# Patient Record
Sex: FEMALE | Race: ASIAN | NOT HISPANIC OR LATINO | Employment: UNEMPLOYED | URBAN - METROPOLITAN AREA
[De-identification: names, ages, dates, MRNs, and addresses within clinical notes are randomized per-mention and may not be internally consistent; named-entity substitution may affect disease eponyms.]

---

## 2023-05-16 LAB
ERYTHROCYTE DISTRIBUTION WIDTH (RATIO) BY AUTOMATED COUNT: 12 % (ref 11.5–14.5)
ERYTHROCYTE MEAN CORPUSCULAR HEMOGLOBIN CONCENTRATION (G/DL) BY AUTOMATED: 32.2 G/DL (ref 32–36)
ERYTHROCYTE MEAN CORPUSCULAR VOLUME (FL) BY AUTOMATED COUNT: 101 FL (ref 80–100)
ERYTHROCYTES (10*6/UL) IN BLOOD BY AUTOMATED COUNT: 3.04 X10E12/L (ref 4–5.2)
FOLATE, SERUM, PREGNANCY: 18 NG/ML
GLUCOSE, 1 HR SCREEN, PREG: 99 MG/DL
HEMATOCRIT (%) IN BLOOD BY AUTOMATED COUNT: 30.7 % (ref 36–46)
HEMOGLOBIN (G/DL) IN BLOOD: 9.9 G/DL (ref 12–16)
LEUKOCYTES (10*3/UL) IN BLOOD BY AUTOMATED COUNT: 7.3 X10E9/L (ref 4.4–11.3)
NRBC (PER 100 WBCS) BY AUTOMATED COUNT: 0 /100 WBC (ref 0–0)
PLATELETS (10*3/UL) IN BLOOD AUTOMATED COUNT: 208 X10E9/L (ref 150–450)
REFLEX ADDED, ANEMIA PANEL: ABNORMAL
VITAMIN B12, PREGNANCY: 432 PG/ML

## 2023-08-10 LAB — GROUP B STREP SCREEN: NORMAL

## 2023-10-12 PROBLEM — O36.8190 DECREASED FETAL MOVEMENT (HHS-HCC): Status: ACTIVE | Noted: 2023-10-12

## 2023-10-12 PROBLEM — O20.0 THREATENED MISCARRIAGE (HHS-HCC): Status: ACTIVE | Noted: 2023-10-12

## 2023-10-12 PROBLEM — M79.673 PAIN OF FOOT: Status: ACTIVE | Noted: 2023-10-12

## 2023-10-12 PROBLEM — N91.0 AMENORRHEA, PRIMARY: Status: ACTIVE | Noted: 2023-10-12

## 2023-10-12 PROBLEM — O99.012 ANEMIA DURING PREGNANCY IN SECOND TRIMESTER (HHS-HCC): Status: ACTIVE | Noted: 2023-10-12

## 2023-10-12 PROBLEM — M71.379 SYNOVIAL CYST OF ANKLE AND FOOT REGION: Status: ACTIVE | Noted: 2023-10-12

## 2023-10-12 PROBLEM — R50.9 FEVER: Status: ACTIVE | Noted: 2023-10-12

## 2023-10-12 PROBLEM — O03.9 COMPLETE ABORTION (HHS-HCC): Status: ACTIVE | Noted: 2023-10-12

## 2023-10-12 PROBLEM — O03.4 INCOMPLETE ABORTION (HHS-HCC): Status: ACTIVE | Noted: 2023-10-12

## 2023-10-12 RX ORDER — PNV NO.95/FERROUS FUM/FOLIC AC 28MG-0.8MG
TABLET ORAL
COMMUNITY

## 2023-10-12 RX ORDER — CALCIUM CITRATE, IRON PENTACARBONYL, FERROUS FUMARATE, CHOLECALCIFEROL, .ALPHA.-TOCOPHEROL, DL-, PYRIDOXINE HYDROCHLORIDE, FOLIC ACID, AND DOCONEXENT 2; 62; 27; 200; 15; 12.5; 1; 2 MG/1; MG/1; MG/1; [IU]/1; [IU]/1; MG/1; MG/1; MG/1
1 CAPSULE, GELATIN COATED ORAL DAILY
COMMUNITY

## 2023-10-12 RX ORDER — FERROUS SULFATE TAB 325 MG (65 MG ELEMENTAL FE) 325 (65 FE) MG
1 TAB ORAL DAILY
COMMUNITY

## 2023-10-13 ENCOUNTER — OFFICE VISIT (OUTPATIENT)
Dept: OBSTETRICS AND GYNECOLOGY | Facility: CLINIC | Age: 33
End: 2023-10-13
Payer: COMMERCIAL

## 2023-10-13 VITALS
SYSTOLIC BLOOD PRESSURE: 103 MMHG | BODY MASS INDEX: 20.04 KG/M2 | WEIGHT: 99.4 LBS | HEIGHT: 59 IN | DIASTOLIC BLOOD PRESSURE: 70 MMHG

## 2023-10-13 PROCEDURE — 1036F TOBACCO NON-USER: CPT | Performed by: OBSTETRICS & GYNECOLOGY

## 2023-10-13 PROCEDURE — 99213 OFFICE O/P EST LOW 20 MIN: CPT | Performed by: OBSTETRICS & GYNECOLOGY

## 2023-10-13 NOTE — PROGRESS NOTES
"Patient presents for 6-week postpartum visit.    Patient is status post induction of labor for fetal arrhythmia complicated by nonreassuring fetal heart tracing and primary low-transverse  section along with postpartum hemorrhage that did not require blood transfusion on 2023.  Postpartum patient has been doing quite well she admits to regular bowel movements denies any urinary symptoms.  Pain is very minimal she has no bleeding x2 weeks.  Not interested in birth control.  She is breast-feeding without any issues.  Denies postpartum depression.    Physical exam:  /70 (BP Location: Left arm, Patient Position: Sitting)   Ht 1.499 m (4' 11\")   Wt 45.1 kg (99 lb 6.4 oz)   Breastfeeding Yes   BMI 20.08 kg/m²     Declines breast exam.  Abdomen soft and nontender.  Incision clean dry and intact.  Pelvic: External genitalia normal, vagina normal rugae good tone cervix is clean uterus is small freely mobile nontender no palpable adnexal masses or tenderness.  Extremities nontender.    Normal postpartum check, patient declines birth control at this time, okay to return to normal activities with no restrictions.  She will follow-up in 1 year or as needed.  "

## 2023-11-02 LAB — LAB MOLECULAR CA TECHNICAL NOTES: NORMAL

## 2023-11-28 ENCOUNTER — TELEPHONE (OUTPATIENT)
Dept: OBSTETRICS AND GYNECOLOGY | Facility: CLINIC | Age: 33
End: 2023-11-28
Payer: COMMERCIAL

## 2023-12-02 ENCOUNTER — LAB (OUTPATIENT)
Dept: LAB | Facility: LAB | Age: 33
End: 2023-12-02
Payer: COMMERCIAL

## 2023-12-02 LAB
ERYTHROCYTE [DISTWIDTH] IN BLOOD BY AUTOMATED COUNT: 11.3 % (ref 11.5–14.5)
HCT VFR BLD AUTO: 41.5 % (ref 36–46)
HGB BLD-MCNC: 13.6 G/DL (ref 12–16)
MCH RBC QN AUTO: 31.3 PG (ref 26–34)
MCHC RBC AUTO-ENTMCNC: 32.8 G/DL (ref 32–36)
MCV RBC AUTO: 95 FL (ref 80–100)
NRBC BLD-RTO: 0 /100 WBCS (ref 0–0)
PLATELET # BLD AUTO: 218 X10*3/UL (ref 150–450)
RBC # BLD AUTO: 4.35 X10*6/UL (ref 4–5.2)
WBC # BLD AUTO: 7.3 X10*3/UL (ref 4.4–11.3)

## 2023-12-02 PROCEDURE — 36415 COLL VENOUS BLD VENIPUNCTURE: CPT

## 2023-12-02 PROCEDURE — 85027 COMPLETE CBC AUTOMATED: CPT

## 2023-12-08 NOTE — RESULT ENCOUNTER NOTE
Jose oRberto Henao,    Just wanted to let you know that your blood count came back normal. No need to take additional iron at this time. I do recommend a daily vitamin for women. Happy Holidays to you and your family.

## 2023-12-14 ENCOUNTER — OFFICE VISIT (OUTPATIENT)
Dept: PRIMARY CARE | Facility: CLINIC | Age: 33
End: 2023-12-14
Payer: COMMERCIAL

## 2023-12-14 VITALS
BODY MASS INDEX: 19.96 KG/M2 | DIASTOLIC BLOOD PRESSURE: 60 MMHG | HEIGHT: 59 IN | SYSTOLIC BLOOD PRESSURE: 102 MMHG | WEIGHT: 99 LBS

## 2023-12-14 DIAGNOSIS — Z00.00 ENCOUNTER FOR PREVENTIVE HEALTH EXAMINATION: Primary | ICD-10-CM

## 2023-12-14 PROCEDURE — 99395 PREV VISIT EST AGE 18-39: CPT | Performed by: STUDENT IN AN ORGANIZED HEALTH CARE EDUCATION/TRAINING PROGRAM

## 2023-12-14 PROCEDURE — 1036F TOBACCO NON-USER: CPT | Performed by: STUDENT IN AN ORGANIZED HEALTH CARE EDUCATION/TRAINING PROGRAM

## 2023-12-14 NOTE — PROGRESS NOTES
"Subjective   Patient ID: Earlene Rock is a 33 y.o. female who presents for Annual Exam (CPE/Form to be signed).    HPI   Yearly physical.  She feels well.  She gave birth to her first child about 3 months ago, baby girl.  Accompanied today by her .  Review of Systems  12-point ROS reviewed and was negative unless otherwise noted in HPI.    Objective   /60   Ht 1.499 m (4' 11\")   Wt (!) 44.9 kg (99 lb)   BMI 20.00 kg/m²     Physical Exam  GEN: conversant, NAD  HEENT: PERRL, EOMI, MMM  NECK: supple, no carotid bruits appreciated b/l  CV: S1, S2, RRR  PULM: CTAB  ABD: soft, NT, ND  NEURO: no new gross focal deficits  EXT: no sig LE edema  PSYCH: appropriate affect    Assessment/Plan     #Recent birth of child: baby girl via .     #HM: Tetanus vaccine in 2016. Vaccinated against COVID-19. Advised yearly flu shot. Follows with ob/gyn. Recent labs reviewed.     RTC 6-12 mos      "

## 2024-12-31 ENCOUNTER — APPOINTMENT (OUTPATIENT)
Dept: PRIMARY CARE | Facility: CLINIC | Age: 34
End: 2024-12-31
Payer: COMMERCIAL

## 2024-12-31 ENCOUNTER — LAB (OUTPATIENT)
Dept: LAB | Facility: LAB | Age: 34
End: 2024-12-31
Payer: COMMERCIAL

## 2024-12-31 VITALS
DIASTOLIC BLOOD PRESSURE: 62 MMHG | HEIGHT: 59 IN | SYSTOLIC BLOOD PRESSURE: 90 MMHG | BODY MASS INDEX: 20.16 KG/M2 | WEIGHT: 100 LBS

## 2024-12-31 DIAGNOSIS — Z00.00 ENCOUNTER FOR PREVENTIVE HEALTH EXAMINATION: Primary | ICD-10-CM

## 2024-12-31 DIAGNOSIS — Z00.00 HEALTH CARE MAINTENANCE: ICD-10-CM

## 2024-12-31 DIAGNOSIS — Z13.220 LIPID SCREENING: ICD-10-CM

## 2024-12-31 LAB
ALBUMIN SERPL BCP-MCNC: 4.2 G/DL (ref 3.4–5)
ALP SERPL-CCNC: 51 U/L (ref 33–110)
ALT SERPL W P-5'-P-CCNC: 12 U/L (ref 7–45)
ANION GAP SERPL CALC-SCNC: 12 MMOL/L (ref 10–20)
AST SERPL W P-5'-P-CCNC: 13 U/L (ref 9–39)
BILIRUB SERPL-MCNC: 0.3 MG/DL (ref 0–1.2)
BUN SERPL-MCNC: 14 MG/DL (ref 6–23)
CALCIUM SERPL-MCNC: 9.3 MG/DL (ref 8.6–10.6)
CHLORIDE SERPL-SCNC: 107 MMOL/L (ref 98–107)
CHOLEST SERPL-MCNC: 160 MG/DL (ref 0–199)
CHOLESTEROL/HDL RATIO: 2.7
CO2 SERPL-SCNC: 26 MMOL/L (ref 21–32)
CREAT SERPL-MCNC: 0.6 MG/DL (ref 0.5–1.05)
EGFRCR SERPLBLD CKD-EPI 2021: >90 ML/MIN/1.73M*2
ERYTHROCYTE [DISTWIDTH] IN BLOOD BY AUTOMATED COUNT: 11.7 % (ref 11.5–14.5)
EST. AVERAGE GLUCOSE BLD GHB EST-MCNC: 85 MG/DL
GLUCOSE SERPL-MCNC: 117 MG/DL (ref 74–99)
HBA1C MFR BLD: 4.6 %
HCT VFR BLD AUTO: 38.5 % (ref 36–46)
HDLC SERPL-MCNC: 59.7 MG/DL
HGB BLD-MCNC: 12.3 G/DL (ref 12–16)
LDLC SERPL CALC-MCNC: 86 MG/DL
MCH RBC QN AUTO: 29.9 PG (ref 26–34)
MCHC RBC AUTO-ENTMCNC: 31.9 G/DL (ref 32–36)
MCV RBC AUTO: 94 FL (ref 80–100)
NON HDL CHOLESTEROL: 100 MG/DL (ref 0–149)
NRBC BLD-RTO: 0 /100 WBCS (ref 0–0)
PLATELET # BLD AUTO: 213 X10*3/UL (ref 150–450)
POTASSIUM SERPL-SCNC: 3.9 MMOL/L (ref 3.5–5.3)
PROT SERPL-MCNC: 6.9 G/DL (ref 6.4–8.2)
RBC # BLD AUTO: 4.11 X10*6/UL (ref 4–5.2)
SODIUM SERPL-SCNC: 141 MMOL/L (ref 136–145)
TRIGL SERPL-MCNC: 74 MG/DL (ref 0–149)
TSH SERPL-ACNC: 2.39 MIU/L (ref 0.44–3.98)
VLDL: 15 MG/DL (ref 0–40)
WBC # BLD AUTO: 8.1 X10*3/UL (ref 4.4–11.3)

## 2024-12-31 PROCEDURE — 84443 ASSAY THYROID STIM HORMONE: CPT

## 2024-12-31 PROCEDURE — 80053 COMPREHEN METABOLIC PANEL: CPT

## 2024-12-31 PROCEDURE — 85027 COMPLETE CBC AUTOMATED: CPT

## 2024-12-31 PROCEDURE — 99395 PREV VISIT EST AGE 18-39: CPT | Performed by: STUDENT IN AN ORGANIZED HEALTH CARE EDUCATION/TRAINING PROGRAM

## 2024-12-31 PROCEDURE — 80061 LIPID PANEL: CPT

## 2024-12-31 PROCEDURE — 83036 HEMOGLOBIN GLYCOSYLATED A1C: CPT

## 2024-12-31 PROCEDURE — 3008F BODY MASS INDEX DOCD: CPT | Performed by: STUDENT IN AN ORGANIZED HEALTH CARE EDUCATION/TRAINING PROGRAM

## 2024-12-31 PROCEDURE — 1036F TOBACCO NON-USER: CPT | Performed by: STUDENT IN AN ORGANIZED HEALTH CARE EDUCATION/TRAINING PROGRAM

## 2024-12-31 NOTE — PROGRESS NOTES
Subjective   Patient ID: Earlene Rock is a 34 y.o. female who presents for Annual Exam.    HPI   Yearly physical.    Accompanied by her  and 16 month old daughter.    She feels well physically.    Not exercising. Diet is fairly healthy.    Review of Systems  12-point ROS reviewed and was negative unless otherwise noted in HPI.    Objective   BP 90/62   Wt 52.6 kg (116 lb)   BMI 23.43 kg/m²     Physical Exam  GEN: conversant, NAD  HEENT: PERRL, EOMI, MMM  NECK: supple, no carotid bruits appreciated b/l  CV: S1, S2, RRR  PULM: CTAB  ABD: soft, NT, ND  NEURO: no new gross focal deficits  EXT: no sig LE edema  PSYCH: appropriate affect    Assessment/Plan          #HM: Tetanus vaccine in 2016. Vaccinated against COVID-19. Advised yearly flu shot. Follows with ob/gyn. Routine labs.      RTC 6-12 mo